# Patient Record
Sex: MALE | Race: WHITE | NOT HISPANIC OR LATINO | URBAN - METROPOLITAN AREA
[De-identification: names, ages, dates, MRNs, and addresses within clinical notes are randomized per-mention and may not be internally consistent; named-entity substitution may affect disease eponyms.]

---

## 2017-01-11 ENCOUNTER — IMPORTED ENCOUNTER (OUTPATIENT)
Dept: URBAN - METROPOLITAN AREA CLINIC 31 | Facility: CLINIC | Age: 77
End: 2017-01-11

## 2017-01-11 PROBLEM — H25.813: Noted: 2017-01-11

## 2017-01-11 PROBLEM — H04.123: Noted: 2017-01-11

## 2017-01-11 PROCEDURE — 92004 COMPRE OPH EXAM NEW PT 1/>: CPT

## 2017-01-11 PROCEDURE — 92015 DETERMINE REFRACTIVE STATE: CPT

## 2017-03-17 ENCOUNTER — IMPORTED ENCOUNTER (OUTPATIENT)
Dept: URBAN - METROPOLITAN AREA CLINIC 31 | Facility: CLINIC | Age: 77
End: 2017-03-17

## 2017-03-17 PROBLEM — H25.811: Noted: 2017-03-17

## 2017-03-17 PROBLEM — H25.812: Noted: 2017-03-17

## 2017-03-17 PROBLEM — H04.123: Noted: 2017-03-17

## 2017-03-17 PROCEDURE — 99213 OFFICE O/P EST LOW 20 MIN: CPT

## 2017-03-17 NOTE — PATIENT DISCUSSION
1.  Dry Eye OU:  Start Restasis BID. Discussed that Restasis helps to increase the production of the patient's own tears and therefore can take 3-4 months for an improvement in symptoms. Use AT prn. FMl helped but benefit not significant. 2.  Combined Types of Cataract OD: Explained how cataracts can effect vision. Recommend clinical observation. The patient was advised to contact us if any change or worsening of vision. 3. Combined Types of Cataract OS: Discussed the risks benefits alternatives and limitations of cataract surgery including infection bleeding loss of vision retinal tears detachment. Refractive options were reviewed. The patient stated a full understanding and a desire to proceed with the procedure in the left eye. Patient has elected to be optimized for distance vision in the left eye. The patient will still need glasses for reading and to possibly fine tune distance vision. 4. Return for an appointment in 2 weeks for cataract evaluation. with Dr. Meryl Andrew. 5.  Return for an appointment in 6 weeks for office call. with Dr. Daksha Lozoya.

## 2017-04-27 ENCOUNTER — IMPORTED ENCOUNTER (OUTPATIENT)
Dept: URBAN - METROPOLITAN AREA CLINIC 31 | Facility: CLINIC | Age: 77
End: 2017-04-27

## 2017-04-27 PROBLEM — Z96.1: Noted: 2017-04-27

## 2017-04-27 PROCEDURE — 99024 POSTOP FOLLOW-UP VISIT: CPT

## 2017-04-27 NOTE — PATIENT DISCUSSION
1.  Post-Op Day #1 - Cataract Surgery Left Eye (OS) - doing well. Tears prn. Continue postop drops as directed. Call office with symptoms of pain redness or decreased vision in operative eye.  1 drop of zylet instilled2. Return for an appointment in 1 week for post op exam. with Dr. Anila Oquendo.

## 2017-05-04 ENCOUNTER — IMPORTED ENCOUNTER (OUTPATIENT)
Dept: URBAN - METROPOLITAN AREA CLINIC 31 | Facility: CLINIC | Age: 77
End: 2017-05-04

## 2017-05-04 PROBLEM — Z96.1: Noted: 2017-05-04

## 2017-05-04 PROCEDURE — 99024 POSTOP FOLLOW-UP VISIT: CPT

## 2017-05-04 NOTE — PATIENT DISCUSSION
1.  Post-Op Week #1 - Cataract Surgery Left Eye (OS) -  Intraocular lens stable and surgery very well healed. Patient to resume all normal activities. Finish postop drops as directed. Final Refraction given if necessary. 2. Return for an appointment in 4 weeks for post op exam and post op refraction. with Dr. Lala Garner.

## 2017-06-06 ENCOUNTER — IMPORTED ENCOUNTER (OUTPATIENT)
Dept: URBAN - METROPOLITAN AREA CLINIC 31 | Facility: CLINIC | Age: 77
End: 2017-06-06

## 2017-06-06 PROBLEM — Z96.1: Noted: 2017-06-06

## 2017-06-06 PROBLEM — H16.223: Noted: 2017-06-06

## 2017-06-06 PROCEDURE — 99024 POSTOP FOLLOW-UP VISIT: CPT

## 2017-06-06 NOTE — PATIENT DISCUSSION
1.  Post-Op Cataract Surgery 15-90 days Left Eye (OS)-  Doing well with stable vision. 2. Pseudophakia OS - IOL stable. Monitor. 3. Dry Eye OU:  Continue current management with Restasis. 4.  Return for an appointment in 6 months for comprehensive exam. with Dr. Courtney Thayer

## 2017-07-11 ENCOUNTER — IMPORTED ENCOUNTER (OUTPATIENT)
Dept: URBAN - METROPOLITAN AREA CLINIC 31 | Facility: CLINIC | Age: 77
End: 2017-07-11

## 2017-07-11 PROBLEM — Z96.1: Noted: 2017-07-11

## 2017-07-11 PROBLEM — H25.11: Noted: 2017-07-11

## 2017-07-11 PROCEDURE — 99024 POSTOP FOLLOW-UP VISIT: CPT

## 2017-07-11 NOTE — PATIENT DISCUSSION
1.  Nuclear Sclerotic Cataract OD: Explained how cataracts can effect vision. Recommend clinical observation. The patient was advised to contact us if any change or worsening of vision. 2. Post-Op Cataract Surgery 15-90 days Left Eye (OS)-  Doing well with stable vision. 3. Return for an appointment in 6 months for comprehensive exam. with Dr. Daniel Meadows.

## 2018-07-09 ENCOUNTER — IMPORTED ENCOUNTER (OUTPATIENT)
Dept: URBAN - METROPOLITAN AREA CLINIC 31 | Facility: CLINIC | Age: 78
End: 2018-07-09

## 2018-07-09 PROBLEM — H26.492: Noted: 2018-07-09

## 2018-07-09 PROBLEM — H25.11: Noted: 2018-07-09

## 2018-07-09 PROBLEM — Z96.1: Noted: 2018-07-09

## 2018-07-09 PROBLEM — H43.813: Noted: 2018-07-09

## 2018-07-09 PROCEDURE — 92015 DETERMINE REFRACTIVE STATE: CPT

## 2018-07-09 PROCEDURE — 92014 COMPRE OPH EXAM EST PT 1/>: CPT

## 2018-07-09 NOTE — PATIENT DISCUSSION
Nuclear Sclerotic Cataract OD: Explained how cataracts can effect vision. Recommend clinical observation. The patient was advised to contact us if any change or worsening of vision.

## 2018-07-09 NOTE — PATIENT DISCUSSION
1.  PCO  OS (Posterior Capsule Opacification)   PCO is visually significant and impairment of vision does not meet the patient’s functional needs or interferes with activities of daily living. Risks benefits and alternatives to the Nd:YAG Laser reviewed including elevated IOP immediately postop and retinal tear/detachment. Patient to notify their ophthalmologist promptly if they have a significant change in symptoms such as flashes of light (photopsia) an increase in floaters loss of visual field or decrease in visual acuity after the procedure. Patient will be scheduled in [place] [id number] for Nd:YAG Laser OS with Dr. Jovana Gerardo. 2. Pseudophakia OS - IOL stable. Monitor. 3. Nuclear Sclerotic Cataract OD: Explained how cataracts can effect vision. Recommend clinical observation. The patient was advised to contact us if any change or worsening of vision. 4. PVD OU:  Patient was cautioned to call our office immediately if they experience a substantial change in their symptoms such as an increase in floaters persistent flashes loss of visual field (may appear as a shadow or a curtain) or decrease in visual acuity as these may indicate a retinal tear or detachment.   If this is a new problem patient will need to return for re-examination  as determined by the physician

## 2019-08-06 ENCOUNTER — IMPORTED ENCOUNTER (OUTPATIENT)
Dept: URBAN - METROPOLITAN AREA CLINIC 31 | Facility: CLINIC | Age: 79
End: 2019-08-06

## 2019-08-06 PROBLEM — H25.11: Noted: 2019-08-06

## 2019-08-06 PROBLEM — Z96.1: Noted: 2019-08-06

## 2019-08-06 PROBLEM — H43.813: Noted: 2019-08-06

## 2019-08-06 PROCEDURE — 99214 OFFICE O/P EST MOD 30 MIN: CPT

## 2019-08-06 NOTE — PATIENT DISCUSSION
1.  Nuclear Sclerotic Cataract OD: Explained how cataracts can effect vision. Recommend clinical observation. The patient was advised to contact us if any change or worsening of vision. 2. Pseudophakia OS - IOL stable. Monitor. 3. PVD OU:  Patient was cautioned to call our office immediately if they experience a substantial change in their symptoms such as an increase in floaters persistent flashes loss of visual field (may appear as a shadow or a curtain) or decrease in visual acuity as these may indicate a retinal tear or detachment. If this is a new problem patient will need to return for re-examination  as determined by the physicianReturn for an appointment in 6 months for cataract evaluation. BAT. OD with Dr. Annamary Mcardle.

## 2020-02-06 ENCOUNTER — IMPORTED ENCOUNTER (OUTPATIENT)
Dept: URBAN - METROPOLITAN AREA CLINIC 31 | Facility: CLINIC | Age: 80
End: 2020-02-06

## 2020-02-06 PROBLEM — H25.11: Noted: 2020-02-06

## 2020-02-06 PROBLEM — Z96.1: Noted: 2020-02-06

## 2020-02-06 PROBLEM — H43.813: Noted: 2020-02-06

## 2020-02-06 PROCEDURE — 99214 OFFICE O/P EST MOD 30 MIN: CPT

## 2020-02-06 NOTE — PATIENT DISCUSSION
1.  Nuclear Sclerotic Cataract OD: Explained how cataracts can effect vision. Recommend clinical observation. The patient was advised to contact us if any change or worsening of vision. 2. Pseudophakia OS - IOL stable. Monitor. 3. PVD OU:  Patient was cautioned to call our office immediately if they experience a substantial change in their symptoms such as an increase in floaters persistent flashes loss of visual field (may appear as a shadow or a curtain) or decrease in visual acuity as these may indicate a retinal tear or detachment. If this is a new problem patient will need to return for re-examination  as determined by the physicianReturn for an appointment in 6 months for cataract evaluation. BAT. OD with Dr. Alden Spatz.

## 2020-08-18 ENCOUNTER — IMPORTED ENCOUNTER (OUTPATIENT)
Dept: URBAN - METROPOLITAN AREA CLINIC 31 | Facility: CLINIC | Age: 80
End: 2020-08-18

## 2020-08-18 PROBLEM — Z96.1: Noted: 2020-08-18

## 2020-08-18 PROBLEM — H25.811: Noted: 2020-08-18

## 2020-08-18 PROCEDURE — 92025 CPTRIZED CORNEAL TOPOGRAPHY: CPT

## 2020-08-18 PROCEDURE — 99214 OFFICE O/P EST MOD 30 MIN: CPT

## 2020-08-18 PROCEDURE — 92136 OPHTHALMIC BIOMETRY: CPT

## 2020-08-18 PROCEDURE — 92015 DETERMINE REFRACTIVE STATE: CPT

## 2020-08-18 NOTE — PATIENT DISCUSSION
1. Combined Types of Cataract OD: Discussed the risks benefits alternatives and limitations of cataract surgery including infection bleeding loss of vision retinal tears detachment. The patient stated a full understanding and a desire to proceed with the procedure in the right eye. Refractive options were reviewed. Patient has elected to be optimized for distance vision in the right eye. The patient will still need glasses for reading and to possibly fine tune distance vision. ORA FLACS and MF IOL disucssed2. Pseudophakia OS - IOL stable. Monitor for changes in vision.

## 2020-09-01 ENCOUNTER — IMPORTED ENCOUNTER (OUTPATIENT)
Dept: URBAN - METROPOLITAN AREA CLINIC 31 | Facility: CLINIC | Age: 80
End: 2020-09-01

## 2020-09-15 ENCOUNTER — IMPORTED ENCOUNTER (OUTPATIENT)
Dept: URBAN - METROPOLITAN AREA CLINIC 31 | Facility: CLINIC | Age: 80
End: 2020-09-15

## 2020-09-15 PROBLEM — Z96.1: Noted: 2020-09-15

## 2020-09-15 PROCEDURE — 99024 POSTOP FOLLOW-UP VISIT: CPT

## 2020-09-15 NOTE — PATIENT DISCUSSION
1.  Post-Op Day #1 - Cataract Surgery Right Eye (OD) - Besivance instilled. Aqueous released without incident. 1 gt Xelpros instilled. Continue postop drops as directed. Call office with symptoms of pain redness or decreased vision in operative eye. 1 drop tobramycin instilled. 2. Return for an appointment in 2 days for post op exam. with Dr. Misty Robles. Allergy; Fall Risk;

## 2020-09-18 ENCOUNTER — IMPORTED ENCOUNTER (OUTPATIENT)
Dept: URBAN - METROPOLITAN AREA CLINIC 31 | Facility: CLINIC | Age: 80
End: 2020-09-18

## 2020-09-18 PROBLEM — Z98.89: Noted: 2020-09-18

## 2020-09-18 PROBLEM — Z96.1: Noted: 2020-09-18

## 2020-09-18 PROCEDURE — 99024 POSTOP FOLLOW-UP VISIT: CPT

## 2020-09-22 ENCOUNTER — IMPORTED ENCOUNTER (OUTPATIENT)
Dept: URBAN - METROPOLITAN AREA CLINIC 31 | Facility: CLINIC | Age: 80
End: 2020-09-22

## 2020-09-22 PROBLEM — Z96.1: Noted: 2020-09-22

## 2020-09-22 PROCEDURE — 99024 POSTOP FOLLOW-UP VISIT: CPT

## 2020-09-22 NOTE — PATIENT DISCUSSION
1.  Post-Op Week #1 - Cataract Surgery Right Eye (OD) - Intraocular lens stable and surgery very well healed. Patient to resume all normal activities. Stop Xelpros and Moxi. taper PF TID X 1W then BID X 1W then QD X 1W. Can use OTC readers and sun rx. Call with any problems. 2. Return for an appointment in 4 months for dilated fundus exam. with Dr. Lala Garner.

## 2021-01-20 ENCOUNTER — IMPORTED ENCOUNTER (OUTPATIENT)
Dept: URBAN - METROPOLITAN AREA CLINIC 31 | Facility: CLINIC | Age: 81
End: 2021-01-20

## 2021-01-20 PROBLEM — H04.123: Noted: 2021-01-20

## 2021-01-20 PROBLEM — Z96.1: Noted: 2021-01-20

## 2021-01-20 PROCEDURE — 99214 OFFICE O/P EST MOD 30 MIN: CPT

## 2021-01-20 NOTE — PATIENT DISCUSSION
1.  Pseudophakia OU - IOLs stable. Monitor for changes in vision. 2. Dry Eye OU:  Continue current management with Artificial Tears. 3.  Return for an appointment in 1 year for comprehensive exam. with Dr. Gatito Kwan.

## 2021-05-08 ENCOUNTER — IMPORTED ENCOUNTER (OUTPATIENT)
Dept: URBAN - METROPOLITAN AREA CLINIC 31 | Facility: CLINIC | Age: 81
End: 2021-05-08

## 2021-05-08 PROBLEM — Z96.1: Noted: 2021-05-08

## 2021-05-08 PROBLEM — H33.22: Noted: 2021-05-08

## 2021-05-08 PROCEDURE — 99214 OFFICE O/P EST MOD 30 MIN: CPT

## 2021-05-08 NOTE — PATIENT DISCUSSION
1. Retinal Detachment noted OS with macula off. Call put through to [facility]. Time with patient including exam and waiting for retina return call 1 hour. Beatrice from [facility] called after communicating with Dr. Tawanda Hu. They will call pt and schedule consult for Monday. Pt to remain quiet in a prone position as much as possible until seen. 2. Pseudophakia OU - IOLs stable. Monitor for changes in vision. 3. Return for an appointment in 3 months for comprehensive exam and Optos with Dr. Alma Goddard.

## 2022-04-02 ASSESSMENT — VISUAL ACUITY
OD_CC: 20/25
OU_SC: 20/25-2
OS_CC: 20/50
OS_SC: 20/20-2
OD_CC: J1+
OS_CC: 20/25-3
OD_CC: J1
OD_SC: 20/25-2
OD_GLARE: 20/40
OS_CC: 20/20
OU_CC: 20/25-2
OS_SC: 20/50+2
OD_GLARE: 20/50
OU_CC: 20/25
OD_GLARE: 20/40MED
OD_CC: 20/30-1
OS_CC: 20/25
OD_PH: SC 20/30
OD_CC: 20/20-1
OD_CC: 20/25
OS_CC: 20/25
OD_CC: 20/20
OS_GLARE: 20/60MED
OS_CC: 20/20-1
OD_CC: 20/20
OS_CC: J1+-3
OU_SC: 20/40
OS_CC: 20/20
OD_SC: 20/20-2
OS_CC: 20/20-1
OU_SC: 20/30
OD_CC: 20/25+2
OD_CC: 20/20
OD_CC: 20/40-1
OS_SC: 20/25
OS_CC: J1+
OD_CC: 20/25-3
OD_SC: 20/30+2

## 2022-04-02 ASSESSMENT — TONOMETRY
OD_IOP_MMHG: 11
OS_IOP_MMHG: 10
OD_IOP_MMHG: 14
OS_IOP_MMHG: 14
OD_IOP_MMHG: 12
OD_IOP_MMHG: 11
OD_IOP_MMHG: 10
OD_IOP_MMHG: 14
OS_IOP_MMHG: 12
OS_IOP_MMHG: 11
OD_IOP_MMHG: 11
OS_IOP_MMHG: 17
OD_IOP_MMHG: 17
OS_IOP_MMHG: 12
OD_IOP_MMHG: 12
OS_IOP_MMHG: 13
OS_IOP_MMHG: 12
OS_IOP_MMHG: 15
OD_IOP_MMHG: 38
OS_IOP_MMHG: 10
OS_IOP_MMHG: 15
OD_IOP_MMHG: 14

## 2022-07-09 ENCOUNTER — TELEPHONE ENCOUNTER (OUTPATIENT)
Dept: URBAN - METROPOLITAN AREA CLINIC 121 | Facility: CLINIC | Age: 82
End: 2022-07-09

## 2022-07-10 ENCOUNTER — TELEPHONE ENCOUNTER (OUTPATIENT)
Dept: URBAN - METROPOLITAN AREA CLINIC 121 | Facility: CLINIC | Age: 82
End: 2022-07-10